# Patient Record
Sex: FEMALE | Race: BLACK OR AFRICAN AMERICAN | NOT HISPANIC OR LATINO
[De-identification: names, ages, dates, MRNs, and addresses within clinical notes are randomized per-mention and may not be internally consistent; named-entity substitution may affect disease eponyms.]

---

## 2018-08-14 ENCOUNTER — APPOINTMENT (OUTPATIENT)
Dept: PULMONOLOGY | Facility: CLINIC | Age: 48
End: 2018-08-14
Payer: COMMERCIAL

## 2018-08-14 ENCOUNTER — NON-APPOINTMENT (OUTPATIENT)
Age: 48
End: 2018-08-14

## 2018-08-14 VITALS
HEIGHT: 61 IN | WEIGHT: 179 LBS | BODY MASS INDEX: 33.79 KG/M2 | DIASTOLIC BLOOD PRESSURE: 79 MMHG | OXYGEN SATURATION: 100 % | SYSTOLIC BLOOD PRESSURE: 119 MMHG | HEART RATE: 84 BPM

## 2018-08-14 DIAGNOSIS — E88.81 METABOLIC SYNDROME: ICD-10-CM

## 2018-08-14 DIAGNOSIS — R00.2 PALPITATIONS: ICD-10-CM

## 2018-08-14 DIAGNOSIS — E66.9 OBESITY, UNSPECIFIED: ICD-10-CM

## 2018-08-14 DIAGNOSIS — R06.00 DYSPNEA, UNSPECIFIED: ICD-10-CM

## 2018-08-14 DIAGNOSIS — G47.33 OBSTRUCTIVE SLEEP APNEA (ADULT) (PEDIATRIC): ICD-10-CM

## 2018-08-14 DIAGNOSIS — E55.9 VITAMIN D DEFICIENCY, UNSPECIFIED: ICD-10-CM

## 2018-08-14 PROCEDURE — 94727 GAS DIL/WSHOT DETER LNG VOL: CPT

## 2018-08-14 PROCEDURE — 94729 DIFFUSING CAPACITY: CPT

## 2018-08-14 PROCEDURE — 94060 EVALUATION OF WHEEZING: CPT

## 2018-08-14 PROCEDURE — 93000 ELECTROCARDIOGRAM COMPLETE: CPT | Mod: 59

## 2018-08-14 PROCEDURE — 99204 OFFICE O/P NEW MOD 45 MIN: CPT | Mod: 25

## 2018-08-15 LAB
25(OH)D3 SERPL-MCNC: 20.3 NG/ML
HBA1C MFR BLD HPLC: 6 %
INSULIN SERPL-MCNC: 24.5 UU/ML

## 2018-08-28 ENCOUNTER — APPOINTMENT (OUTPATIENT)
Dept: PULMONOLOGY | Facility: CLINIC | Age: 48
End: 2018-08-28
Payer: COMMERCIAL

## 2018-08-28 PROCEDURE — 95806 SLEEP STUDY UNATT&RESP EFFT: CPT

## 2018-09-05 ENCOUNTER — RESULT REVIEW (OUTPATIENT)
Age: 48
End: 2018-09-05

## 2018-09-20 ENCOUNTER — APPOINTMENT (OUTPATIENT)
Dept: PULMONOLOGY | Facility: CLINIC | Age: 48
End: 2018-09-20

## 2019-06-19 ENCOUNTER — INPATIENT (INPATIENT)
Facility: HOSPITAL | Age: 49
LOS: 0 days | Discharge: HOME | DRG: 313 | End: 2019-06-20
Attending: STUDENT IN AN ORGANIZED HEALTH CARE EDUCATION/TRAINING PROGRAM | Admitting: STUDENT IN AN ORGANIZED HEALTH CARE EDUCATION/TRAINING PROGRAM
Payer: COMMERCIAL

## 2019-06-19 VITALS
OXYGEN SATURATION: 98 % | DIASTOLIC BLOOD PRESSURE: 88 MMHG | TEMPERATURE: 98 F | HEART RATE: 83 BPM | RESPIRATION RATE: 18 BRPM | SYSTOLIC BLOOD PRESSURE: 141 MMHG

## 2019-06-19 DIAGNOSIS — F17.200 NICOTINE DEPENDENCE, UNSPECIFIED, UNCOMPLICATED: ICD-10-CM

## 2019-06-19 DIAGNOSIS — Z29.9 ENCOUNTER FOR PROPHYLACTIC MEASURES, UNSPECIFIED: ICD-10-CM

## 2019-06-19 DIAGNOSIS — I24.9 ACUTE ISCHEMIC HEART DISEASE, UNSPECIFIED: ICD-10-CM

## 2019-06-19 DIAGNOSIS — R07.9 CHEST PAIN, UNSPECIFIED: ICD-10-CM

## 2019-06-19 LAB
ALBUMIN SERPL ELPH-MCNC: 3.7 G/DL — SIGNIFICANT CHANGE UP (ref 3.5–5)
ALP SERPL-CCNC: 75 U/L — SIGNIFICANT CHANGE UP (ref 40–120)
ALT FLD-CCNC: 24 U/L DA — SIGNIFICANT CHANGE UP (ref 10–60)
ANION GAP SERPL CALC-SCNC: 4 MMOL/L — LOW (ref 5–17)
APTT BLD: 29.5 SEC — SIGNIFICANT CHANGE UP (ref 27.5–36.3)
AST SERPL-CCNC: 13 U/L — SIGNIFICANT CHANGE UP (ref 10–40)
BILIRUB SERPL-MCNC: 0.2 MG/DL — SIGNIFICANT CHANGE UP (ref 0.2–1.2)
BUN SERPL-MCNC: 10 MG/DL — SIGNIFICANT CHANGE UP (ref 7–18)
CALCIUM SERPL-MCNC: 9.4 MG/DL — SIGNIFICANT CHANGE UP (ref 8.4–10.5)
CHLORIDE SERPL-SCNC: 108 MMOL/L — SIGNIFICANT CHANGE UP (ref 96–108)
CK MB BLD-MCNC: <1.2 % — SIGNIFICANT CHANGE UP (ref 0–3.5)
CK MB CFR SERPL CALC: <1 NG/ML — SIGNIFICANT CHANGE UP (ref 0–3.6)
CK SERPL-CCNC: 83 U/L — SIGNIFICANT CHANGE UP (ref 21–215)
CO2 SERPL-SCNC: 29 MMOL/L — SIGNIFICANT CHANGE UP (ref 22–31)
CREAT SERPL-MCNC: 0.72 MG/DL — SIGNIFICANT CHANGE UP (ref 0.5–1.3)
GLUCOSE SERPL-MCNC: 104 MG/DL — HIGH (ref 70–99)
HCG SERPL-ACNC: 1 MIU/ML — SIGNIFICANT CHANGE UP
HCT VFR BLD CALC: 36.7 % — SIGNIFICANT CHANGE UP (ref 34.5–45)
HGB BLD-MCNC: 11.8 G/DL — SIGNIFICANT CHANGE UP (ref 11.5–15.5)
INR BLD: 1.12 RATIO — SIGNIFICANT CHANGE UP (ref 0.88–1.16)
MCHC RBC-ENTMCNC: 27.8 PG — SIGNIFICANT CHANGE UP (ref 27–34)
MCHC RBC-ENTMCNC: 32.2 GM/DL — SIGNIFICANT CHANGE UP (ref 32–36)
MCV RBC AUTO: 86.6 FL — SIGNIFICANT CHANGE UP (ref 80–100)
NRBC # BLD: 0 /100 WBCS — SIGNIFICANT CHANGE UP (ref 0–0)
PLATELET # BLD AUTO: 171 K/UL — SIGNIFICANT CHANGE UP (ref 150–400)
POTASSIUM SERPL-MCNC: 4.5 MMOL/L — SIGNIFICANT CHANGE UP (ref 3.5–5.3)
POTASSIUM SERPL-SCNC: 4.5 MMOL/L — SIGNIFICANT CHANGE UP (ref 3.5–5.3)
PROT SERPL-MCNC: 7.7 G/DL — SIGNIFICANT CHANGE UP (ref 6–8.3)
PROTHROM AB SERPL-ACNC: 12.5 SEC — SIGNIFICANT CHANGE UP (ref 10–12.9)
RBC # BLD: 4.24 M/UL — SIGNIFICANT CHANGE UP (ref 3.8–5.2)
RBC # FLD: 13 % — SIGNIFICANT CHANGE UP (ref 10.3–14.5)
SODIUM SERPL-SCNC: 141 MMOL/L — SIGNIFICANT CHANGE UP (ref 135–145)
TROPONIN I SERPL-MCNC: <0.015 NG/ML — SIGNIFICANT CHANGE UP (ref 0–0.04)
TROPONIN I SERPL-MCNC: <0.015 NG/ML — SIGNIFICANT CHANGE UP (ref 0–0.04)
WBC # BLD: 4.28 K/UL — SIGNIFICANT CHANGE UP (ref 3.8–10.5)
WBC # FLD AUTO: 4.28 K/UL — SIGNIFICANT CHANGE UP (ref 3.8–10.5)

## 2019-06-19 PROCEDURE — 99223 1ST HOSP IP/OBS HIGH 75: CPT | Mod: GC

## 2019-06-19 PROCEDURE — 71045 X-RAY EXAM CHEST 1 VIEW: CPT | Mod: 26

## 2019-06-19 PROCEDURE — 99285 EMERGENCY DEPT VISIT HI MDM: CPT

## 2019-06-19 RX ORDER — METOPROLOL TARTRATE 50 MG
12.5 TABLET ORAL
Refills: 0 | Status: DISCONTINUED | OUTPATIENT
Start: 2019-06-19 | End: 2019-06-20

## 2019-06-19 RX ORDER — SIMVASTATIN 20 MG/1
40 TABLET, FILM COATED ORAL AT BEDTIME
Refills: 0 | Status: DISCONTINUED | OUTPATIENT
Start: 2019-06-19 | End: 2019-06-20

## 2019-06-19 RX ORDER — ASPIRIN/CALCIUM CARB/MAGNESIUM 324 MG
81 TABLET ORAL DAILY
Refills: 0 | Status: DISCONTINUED | OUTPATIENT
Start: 2019-06-19 | End: 2019-06-20

## 2019-06-19 RX ORDER — NITROGLYCERIN 6.5 MG
0.4 CAPSULE, EXTENDED RELEASE ORAL
Refills: 0 | Status: DISCONTINUED | OUTPATIENT
Start: 2019-06-19 | End: 2019-06-20

## 2019-06-19 RX ADMIN — SIMVASTATIN 40 MILLIGRAM(S): 20 TABLET, FILM COATED ORAL at 21:48

## 2019-06-19 RX ADMIN — Medication 0.4 MILLIGRAM(S): at 15:12

## 2019-06-19 NOTE — ED PROVIDER NOTE - PROGRESS NOTE DETAILS
Patient is resting comfortably, NAD. Feels improved after nitroglycerin. Patient endorsed to Dr. Finch

## 2019-06-19 NOTE — H&P ADULT - ATTENDING COMMENTS
Agree with above   patient seen and examined in ED. She is 50 yo lady with no significant PMH, but with family history of early CAD, presented with c.o chest pain. The chest pain is pressure like in nature with radiation to left arm and arm numbness, which improved with nitroglycerin.   Also, reports sob,  sweating and nausea. Denies headache, dizziness, fever, abdominal pain, change in urinary or bowel habits   She smokes half a pack a day since age of 19.     ROS as above   PE: General:  obese lady, sitting on the bed, NAD   Cardio: regular rate and rhythm   Pulm: clear breath sounds   GI: abdomen is soft, non tender   Extr: no edema   Neuro: AOx3, no focal weakness     Vital Signs Last 24 Hrs  T(C): 36.8 (19 Jun 2019 15:44), Max: 36.8 (19 Jun 2019 14:15)  T(F): 98.2 (19 Jun 2019 15:44), Max: 98.3 (19 Jun 2019 14:15)  HR: 78 (19 Jun 2019 15:44) (78 - 83)  BP: 111/62 (19 Jun 2019 15:44) (111/62 - 141/88)  BP(mean): --  RR: 18 (19 Jun 2019 15:44) (18 - 18)  SpO2: 100% (19 Jun 2019 15:44) (98% - 100%)    1. Chest pain in patient with strong family of CAD in father and sister. Concern for ACS   Admit to telemetry for 24 hrs   Trend CE x3, first set negative   EKG in ED NSR   Follow lipid profile, TSH, HBA1C  Start Aspirin, atorvastatin 40 mg po daily and Pbmbndtgbie60.5 mg PO BID   TTE in AM   Cardiology consult requested     2. Current smoker: smoking cessation counseling provided        Plan discussed with patient in detail.

## 2019-06-19 NOTE — ED ADULT NURSE NOTE - NSIMPLEMENTINTERV_GEN_ALL_ED
Implemented All Universal Safety Interventions:  [place] to call system. Call bell, personal items and telephone within reach. Instruct patient to call for assistance. Room bathroom lighting operational. Non-slip footwear when patient is off stretcher. Physically safe environment: no spills, clutter or unnecessary equipment. Stretcher in lowest position, wheels locked, appropriate side rails in place.

## 2019-06-19 NOTE — H&P ADULT - NSHPPHYSICALEXAM_GEN_ALL_CORE
Vital Signs Last 24 Hrs  T(C): 36.8 (19 Jun 2019 15:44), Max: 36.8 (19 Jun 2019 14:15)  T(F): 98.2 (19 Jun 2019 15:44), Max: 98.3 (19 Jun 2019 14:15)  HR: 78 (19 Jun 2019 15:44) (78 - 83)  BP: 111/62 (19 Jun 2019 15:44) (111/62 - 141/88)  RR: 18 (19 Jun 2019 15:44) (18 - 18)  SpO2: 100% (19 Jun 2019 15:44) (98% - 100%)  .  GENERAL: Well developed, Obese  female, NAD  HEENT:  Normocephalic/Atraumatic, reactive light reflex, moist mucous membranes  NECK: Supple, no JVD  RESP: Symmetric movement of the chest, clear to auscultation bilaterally, no wheeze, no rhonchi, no crackles noted  CVS: S1 and S2 audible, no murmur, rubs or gallops noted  GI: Normal active bowel sounds present, abdomen soft, non tender, non distended  EXTREMITIES:  No edema, no clubbing, cyanosis  MSK: 5/5 strength bilateral upper and lower extremities, intact sensations to light & crude touch  PSYCH: Normal mood, normal affect observed    NEURO: Alert and oriented x 3

## 2019-06-19 NOTE — ED ADULT TRIAGE NOTE - CHIEF COMPLAINT QUOTE
chest pain radiating to left and right shoulder with episode of numbness to left arm. patient also complained of shortness of breath with chest pain

## 2019-06-19 NOTE — H&P ADULT - PROBLEM SELECTOR PLAN 1
Atypical chest pain  Normal cardiac enzymes  EKG: normal sinus @ 78 bpm  CXR: Clear  Low wells score  Ordered Echocardiogram  Tele monitoring and started ASA, Statin  F/u Urine drug screen Atypical chest pain  Normal cardiac enzymes  EKG: normal sinus @ 78 bpm  CXR: Clear  Low wells score  Ordered Echocardiogram  Tele monitoring and started ASA, Statin  F/u Urine drug screen  Consulted Dr Dobson

## 2019-06-19 NOTE — H&P ADULT - ASSESSMENT
49 female with PMH of Fe def Anemia (s/p multiple transfusions in 2002), Cholecystectomy came to hospital for chest pain and tightness. Admitted to medicine for ACS.

## 2019-06-19 NOTE — H&P ADULT - HISTORY OF PRESENT ILLNESS
49 female with PMH of Fe def Anemia (s/p multiple transfusions in 2002), Cholecystectomy came to hospital for chest pain and tightness started acutely today while she was sitting. It started as mild right sided discomfort which resolved and came back as left sided pressure with tightness. She also felt numbness and tingling in left hand. She took aspirin with minimal relief of symptoms. All of this was associated with warmth and shortness of breath. Symptoms resolved after getting nitro in ED.    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 No drugs.  FH: Diabetes in Mother and cancer in multiple family members.    ED Course: Hemodynamically stable. Labs showed normal cardiac enzymes and EKG. CXR was negative. 49 female with PMH of Fe def Anemia (s/p multiple transfusions in 2002), Cholecystectomy came to hospital for chest pain and tightness started acutely today while she was sitting. It started as mild right sided discomfort which resolved and came back as left sided pressure with tightness. She also felt numbness and tingling in left hand. She took aspirin with minimal relief of symptoms. All of this was associated with warmth and shortness of breath. Symptoms resolved after getting nitro in ED. No fever, cough, pleuritic nature of pain, syncope, problems with urine or bowel. Pt only takes Motrin at home for tooth ache as needed.     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 No drugs.  FH: Diabetes in Mother and cancer in multiple family members.    ED Course: Hemodynamically stable. Labs showed normal cardiac enzymes and EKG. CXR was negative.

## 2019-06-19 NOTE — ED PROVIDER NOTE - CLINICAL SUMMARY MEDICAL DECISION MAKING FREE TEXT BOX
pt with exertional chest pain concerning for ACS with multiple cardiac risk factors requires admission for cardiac eval.

## 2019-06-19 NOTE — ED PROVIDER NOTE - OBJECTIVE STATEMENT
48 y/o f with PMHx borderline diabetes (no medications) presents with right sided chest pain but over the next 2 hours became bilateral with tightness and tingling in left arm. Pain at its worst was 9/10 currently 4/10.  when pain was severe it was coupled with  SOB, nausea and diaphoresis worse with excertion. Only remaining sx is chest pain. Smoker 30 years half pack a day. Fx: ACS, father MI in early 60s, sister Mi at 50 50 y/o f with PMHx borderline diabetes (no medications) presents with right sided chest pain but over the next 2 hours became bilateral with tightness and tingling in left arm. Pain at its worst was 9/10, currently 4/10.  when pain was severe it was associated with  SOB, nausea, and diaphoresis worse with exertion. Only remaining sx is chest pain. Smoker 30 years half pack a day. No fever, cough, ap, vomiting. Fx: ACS, father MI in early 60s, sister Mi at 50

## 2019-06-20 ENCOUNTER — TRANSCRIPTION ENCOUNTER (OUTPATIENT)
Age: 49
End: 2019-06-20

## 2019-06-20 VITALS
OXYGEN SATURATION: 100 % | RESPIRATION RATE: 18 BRPM | DIASTOLIC BLOOD PRESSURE: 59 MMHG | TEMPERATURE: 98 F | SYSTOLIC BLOOD PRESSURE: 106 MMHG | HEART RATE: 85 BPM

## 2019-06-20 LAB
ALBUMIN SERPL ELPH-MCNC: 3.5 G/DL — SIGNIFICANT CHANGE UP (ref 3.5–5)
ALP SERPL-CCNC: 67 U/L — SIGNIFICANT CHANGE UP (ref 40–120)
ALT FLD-CCNC: 22 U/L DA — SIGNIFICANT CHANGE UP (ref 10–60)
ANION GAP SERPL CALC-SCNC: 6 MMOL/L — SIGNIFICANT CHANGE UP (ref 5–17)
AST SERPL-CCNC: 11 U/L — SIGNIFICANT CHANGE UP (ref 10–40)
BASOPHILS # BLD AUTO: 0.02 K/UL — SIGNIFICANT CHANGE UP (ref 0–0.2)
BASOPHILS NFR BLD AUTO: 0.5 % — SIGNIFICANT CHANGE UP (ref 0–2)
BILIRUB SERPL-MCNC: 0.3 MG/DL — SIGNIFICANT CHANGE UP (ref 0.2–1.2)
BUN SERPL-MCNC: 10 MG/DL — SIGNIFICANT CHANGE UP (ref 7–18)
CALCIUM SERPL-MCNC: 8.3 MG/DL — LOW (ref 8.4–10.5)
CHLORIDE SERPL-SCNC: 107 MMOL/L — SIGNIFICANT CHANGE UP (ref 96–108)
CHOLEST SERPL-MCNC: 132 MG/DL — SIGNIFICANT CHANGE UP (ref 10–199)
CK MB BLD-MCNC: <1.4 % — SIGNIFICANT CHANGE UP (ref 0–3.5)
CK MB CFR SERPL CALC: <1 NG/ML — SIGNIFICANT CHANGE UP (ref 0–3.6)
CK SERPL-CCNC: 69 U/L — SIGNIFICANT CHANGE UP (ref 21–215)
CO2 SERPL-SCNC: 25 MMOL/L — SIGNIFICANT CHANGE UP (ref 22–31)
CREAT SERPL-MCNC: 0.69 MG/DL — SIGNIFICANT CHANGE UP (ref 0.5–1.3)
EOSINOPHIL # BLD AUTO: 0.15 K/UL — SIGNIFICANT CHANGE UP (ref 0–0.5)
EOSINOPHIL NFR BLD AUTO: 4 % — SIGNIFICANT CHANGE UP (ref 0–6)
FOLATE SERPL-MCNC: 7 NG/ML — SIGNIFICANT CHANGE UP
GLUCOSE SERPL-MCNC: 99 MG/DL — SIGNIFICANT CHANGE UP (ref 70–99)
HBA1C BLD-MCNC: 5.9 % — HIGH (ref 4–5.6)
HCT VFR BLD CALC: 34.9 % — SIGNIFICANT CHANGE UP (ref 34.5–45)
HDLC SERPL-MCNC: 50 MG/DL — SIGNIFICANT CHANGE UP
HGB BLD-MCNC: 11.3 G/DL — LOW (ref 11.5–15.5)
IMM GRANULOCYTES NFR BLD AUTO: 0.3 % — SIGNIFICANT CHANGE UP (ref 0–1.5)
LIPID PNL WITH DIRECT LDL SERPL: 71 MG/DL — SIGNIFICANT CHANGE UP
LYMPHOCYTES # BLD AUTO: 1.71 K/UL — SIGNIFICANT CHANGE UP (ref 1–3.3)
LYMPHOCYTES # BLD AUTO: 46.1 % — HIGH (ref 13–44)
MAGNESIUM SERPL-MCNC: 1.9 MG/DL — SIGNIFICANT CHANGE UP (ref 1.6–2.6)
MCHC RBC-ENTMCNC: 27.8 PG — SIGNIFICANT CHANGE UP (ref 27–34)
MCHC RBC-ENTMCNC: 32.4 GM/DL — SIGNIFICANT CHANGE UP (ref 32–36)
MCV RBC AUTO: 85.7 FL — SIGNIFICANT CHANGE UP (ref 80–100)
MONOCYTES # BLD AUTO: 0.49 K/UL — SIGNIFICANT CHANGE UP (ref 0–0.9)
MONOCYTES NFR BLD AUTO: 13.2 % — SIGNIFICANT CHANGE UP (ref 2–14)
NEUTROPHILS # BLD AUTO: 1.33 K/UL — LOW (ref 1.8–7.4)
NEUTROPHILS NFR BLD AUTO: 35.9 % — LOW (ref 43–77)
NRBC # BLD: 0 /100 WBCS — SIGNIFICANT CHANGE UP (ref 0–0)
PCP SPEC-MCNC: SIGNIFICANT CHANGE UP
PHOSPHATE SERPL-MCNC: 3.8 MG/DL — SIGNIFICANT CHANGE UP (ref 2.5–4.5)
PLATELET # BLD AUTO: 175 K/UL — SIGNIFICANT CHANGE UP (ref 150–400)
POTASSIUM SERPL-MCNC: 4 MMOL/L — SIGNIFICANT CHANGE UP (ref 3.5–5.3)
POTASSIUM SERPL-SCNC: 4 MMOL/L — SIGNIFICANT CHANGE UP (ref 3.5–5.3)
PROT SERPL-MCNC: 6.9 G/DL — SIGNIFICANT CHANGE UP (ref 6–8.3)
RBC # BLD: 4.07 M/UL — SIGNIFICANT CHANGE UP (ref 3.8–5.2)
RBC # FLD: 13 % — SIGNIFICANT CHANGE UP (ref 10.3–14.5)
SODIUM SERPL-SCNC: 138 MMOL/L — SIGNIFICANT CHANGE UP (ref 135–145)
TOTAL CHOLESTEROL/HDL RATIO MEASUREMENT: 2.6 RATIO — LOW (ref 3.3–7.1)
TRIGL SERPL-MCNC: 55 MG/DL — SIGNIFICANT CHANGE UP (ref 10–149)
TROPONIN I SERPL-MCNC: <0.015 NG/ML — SIGNIFICANT CHANGE UP (ref 0–0.04)
TSH SERPL-MCNC: 0.97 UU/ML — SIGNIFICANT CHANGE UP (ref 0.34–4.82)
VIT B12 SERPL-MCNC: 504 PG/ML — SIGNIFICANT CHANGE UP (ref 232–1245)
WBC # BLD: 3.71 K/UL — LOW (ref 3.8–10.5)
WBC # FLD AUTO: 3.71 K/UL — LOW (ref 3.8–10.5)

## 2019-06-20 PROCEDURE — 82553 CREATINE MB FRACTION: CPT

## 2019-06-20 PROCEDURE — 78452 HT MUSCLE IMAGE SPECT MULT: CPT | Mod: 26

## 2019-06-20 PROCEDURE — 93306 TTE W/DOPPLER COMPLETE: CPT | Mod: 26

## 2019-06-20 PROCEDURE — 83036 HEMOGLOBIN GLYCOSYLATED A1C: CPT

## 2019-06-20 PROCEDURE — 85610 PROTHROMBIN TIME: CPT

## 2019-06-20 PROCEDURE — 80061 LIPID PANEL: CPT

## 2019-06-20 PROCEDURE — 99222 1ST HOSP IP/OBS MODERATE 55: CPT | Mod: 25

## 2019-06-20 PROCEDURE — 84443 ASSAY THYROID STIM HORMONE: CPT

## 2019-06-20 PROCEDURE — 80307 DRUG TEST PRSMV CHEM ANLYZR: CPT

## 2019-06-20 PROCEDURE — 85027 COMPLETE CBC AUTOMATED: CPT

## 2019-06-20 PROCEDURE — 93306 TTE W/DOPPLER COMPLETE: CPT

## 2019-06-20 PROCEDURE — A9502: CPT

## 2019-06-20 PROCEDURE — 84100 ASSAY OF PHOSPHORUS: CPT

## 2019-06-20 PROCEDURE — 78452 HT MUSCLE IMAGE SPECT MULT: CPT

## 2019-06-20 PROCEDURE — 84484 ASSAY OF TROPONIN QUANT: CPT

## 2019-06-20 PROCEDURE — 71045 X-RAY EXAM CHEST 1 VIEW: CPT

## 2019-06-20 PROCEDURE — 82550 ASSAY OF CK (CPK): CPT

## 2019-06-20 PROCEDURE — 80053 COMPREHEN METABOLIC PANEL: CPT

## 2019-06-20 PROCEDURE — 99238 HOSP IP/OBS DSCHRG MGMT 30/<: CPT | Mod: GC

## 2019-06-20 PROCEDURE — 93005 ELECTROCARDIOGRAM TRACING: CPT

## 2019-06-20 PROCEDURE — 82746 ASSAY OF FOLIC ACID SERUM: CPT

## 2019-06-20 PROCEDURE — 36415 COLL VENOUS BLD VENIPUNCTURE: CPT

## 2019-06-20 PROCEDURE — 93017 CV STRESS TEST TRACING ONLY: CPT

## 2019-06-20 PROCEDURE — 83735 ASSAY OF MAGNESIUM: CPT

## 2019-06-20 PROCEDURE — 93016 CV STRESS TEST SUPVJ ONLY: CPT

## 2019-06-20 PROCEDURE — 84702 CHORIONIC GONADOTROPIN TEST: CPT

## 2019-06-20 PROCEDURE — 99285 EMERGENCY DEPT VISIT HI MDM: CPT | Mod: 25

## 2019-06-20 PROCEDURE — 82607 VITAMIN B-12: CPT

## 2019-06-20 PROCEDURE — 93018 CV STRESS TEST I&R ONLY: CPT

## 2019-06-20 PROCEDURE — 85730 THROMBOPLASTIN TIME PARTIAL: CPT

## 2019-06-20 RX ORDER — IBUPROFEN 200 MG
1 TABLET ORAL
Qty: 0 | Refills: 0 | DISCHARGE

## 2019-06-20 RX ADMIN — Medication 81 MILLIGRAM(S): at 12:17

## 2019-06-20 NOTE — DISCHARGE NOTE PROVIDER - NSDCCPCAREPLAN_GEN_ALL_CORE_FT
PRINCIPAL DISCHARGE DIAGNOSIS  Diagnosis: Chest pain, unspecified type  Assessment and Plan of Treatment: You presented with chest tightness and were admitted to evaluate for acute coronary syndrome, Your ekg did not show abnormalities, heart enzymes were normal. You were seen by our cardiologist who recommended a stress test which was normal. Y      SECONDARY DISCHARGE DIAGNOSES  Diagnosis: Prediabetes  Assessment and Plan of Treatment: Your hemoglobin A1C level was found to be 5.9, suggestive of prediabetes. You are recommended a carbohydrate consistent diet and exercise on a regular basis. Follow up with your primary doctor to monitor your blood sugars. PRINCIPAL DISCHARGE DIAGNOSIS  Diagnosis: Chest pain, unspecified type  Assessment and Plan of Treatment: You presented with chest tightness and were admitted to evaluate for acute coronary syndrome, Your ekg did not show abnormalities, heart enzymes were normal. You were seen by our cardiologist who recommended a stress test which was normal. Your chest pain was atypical. You are recommended to follow up with your primary doctor in 1 week      SECONDARY DISCHARGE DIAGNOSES  Diagnosis: Prediabetes  Assessment and Plan of Treatment: Your hemoglobin A1C level was found to be 5.9, suggestive of prediabetes. You are recommended a carbohydrate consistent diet and exercise on a regular basis. Follow up with your primary doctor to monitor your blood sugars. PRINCIPAL DISCHARGE DIAGNOSIS  Diagnosis: Chest pain, unspecified type  Assessment and Plan of Treatment: You presented with chest tightness and were admitted to evaluate for acute coronary syndrome, Your ekg did not show abnormalities, heart enzymes were normal. You were seen by our cardiologist who recommended echocardiogram and stress test which were normal. Your chest pain was atypical. You are recommended to follow up with your primary doctor in 1 week      SECONDARY DISCHARGE DIAGNOSES  Diagnosis: Prediabetes  Assessment and Plan of Treatment: Your hemoglobin A1C level was found to be 5.9, suggestive of prediabetes. You are recommended a carbohydrate consistent diet and exercise on a regular basis. Follow up with your primary doctor to monitor your blood sugars.

## 2019-06-20 NOTE — DISCHARGE NOTE PROVIDER - CARE PROVIDER_API CALL
Catalino Bobby)  Internal Medicine  [id number] [address] [place] [address]  Phone: [phone]  Fax: [phone]  Follow Up Time: 1 week

## 2019-06-20 NOTE — CHART NOTE - NSCHARTNOTEFT_GEN_A_CORE
Patient seen and examined. No new complains.   CEx3 negative, stress test negative, awaiting TTE.   Vital Signs Last 24 Hrs  T(C): 36.8 (20 Jun 2019 11:23), Max: 36.9 (19 Jun 2019 20:26)  T(F): 98.2 (20 Jun 2019 11:23), Max: 98.5 (19 Jun 2019 20:26)  HR: 74 (20 Jun 2019 11:23) (70 - 85)  BP: 119/76 (20 Jun 2019 11:23) (95/56 - 141/88)  BP(mean): --  RR: 18 (20 Jun 2019 11:23) (18 - 18)  SpO2: 99% (20 Jun 2019 11:23) (93% - 100%)    1. Chest pain - unlikely its cardiac origin. CEx3 negative, stress test negative   Can be discharge home after echo.   d/c Metoprolol.

## 2019-06-20 NOTE — DISCHARGE NOTE NURSING/CASE MANAGEMENT/SOCIAL WORK - NSDCDPATPORTLINK_GEN_ALL_CORE
You can access the [facility] Patient Portal, offered by [facility], by registering with the following website: [email or web address]

## 2019-06-20 NOTE — CONSULT NOTE ADULT - ASSESSMENT
50 yo F smoker with iron deficiency anemia who presented with chest pain.     1. Chest pain:  -Echocardiogram pending  -Will discuss with patient regarding stress test    2. HLD: Lipid panel adequate    ***Note that this is a preliminary note and any recommendations should NOT be carried out until this note is finalized. *** 48 yo F smoker with iron deficiency anemia who presented with chest pain.     1. Chest pain:  -Sounds non-cardiac in nature, however since patient is female and an active smoker, reasonable to perform ischemic evaluation with exercise nuclear stress test. Will also rule out structural heart disease with echocardiogram  ****Stress test and echocardiogram essentially unremarkable, no need for further cardiac testing at this time  -Patient counselled on smoking cessation  -Instructed if symptoms recur or progress she will need further follow up with cardiology    2. HLD: Lipid panel adequate

## 2019-06-20 NOTE — DISCHARGE NOTE PROVIDER - HOSPITAL COURSE
49 female with PMH of Fe def Anemia (s/p multiple transfusions in 2002), Cholecystectomy came to hospital for chest pain and tightness started acutely today while she was sitting. It started as mild right sided discomfort which resolved and came back as left sided pressure with tightness. She also felt numbness and tingling in left hand. She took aspirin with minimal relief of symptoms. All of this was associated with warmth and shortness of breath. Symptoms resolved after getting nitro in ED. No fever, cough, pleuritic nature of pain, syncope, problems with urine or bowel. Pt only takes Motrin at home for tooth ache as needed.     Patient was admitted to tele floor for acs rule out. Cardio Dr Dobson was consulted, ekg nsr, cardiac enzymes and stress test were normal. Echo noted with xxxxxxxxxxxxxxxxxx    Patient's a1c 5.9, prediabetic.        Patient's symptoms have subsided since, is clinically stable for discharge as discussed with attending Dr Finch. 49 female with PMH of Fe def Anemia (s/p multiple transfusions in 2002), Cholecystectomy came to hospital for chest pain and tightness started acutely today while she was sitting. It started as mild right sided discomfort which resolved and came back as left sided pressure with tightness. She also felt numbness and tingling in left hand. She took aspirin with minimal relief of symptoms. All of this was associated with warmth and shortness of breath. Symptoms resolved after getting nitro in ED. No fever, cough, pleuritic nature of pain, syncope, problems with urine or bowel. Pt only takes Motrin at home for tooth ache as needed.     Patient was admitted to tele floor for acs rule out. Cardio Dr Dobson was consulted, ekg nsr, cardiac enzymes , echo and stress test were normal.     Patient's a1c 5.9, prediabetic.        Patient's symptoms have subsided since, is clinically stable for discharge as discussed with attending Dr Finch.

## 2019-06-20 NOTE — CONSULT NOTE ADULT - SUBJECTIVE AND OBJECTIVE BOX
CHIEF COMPLAINT: Chest Pain    HPI: 50 yo F with Fe-deficiency anemia who presented with chest pain     PAST MEDICAL & SURGICAL HISTORY:  As above, also borderline diabetes mellitus, Anemia, Renal Stone, and Asthma  No significant past surgical history    Allergies    IV dye (Vomiting)  No Known Drug Allergies  shellfish (Urticaria)    MEDICATIONS  (STANDING):  aspirin enteric coated 81 milliGRAM(s) Oral daily  metoprolol tartrate 12.5 milliGRAM(s) Oral two times a day  simvastatin 40 milliGRAM(s) Oral at bedtime    MEDICATIONS  (PRN):  nitroglycerin     SubLingual 0.4 milliGRAM(s) SubLingual every 5 minutes PRN Chest Pain      FAMILY HISTORY:    No family history of premature coronary artery disease or sudden cardiac death    SOCIAL HISTORY:  Smoking-  Alcohol-  Illicit Drug use-    REVIEW OF SYSTEMS:  Constitutional: [ ] fever, [ ]weight loss,  [ ]fatigue  Eyes: [ ] visual changes  Respiratory: [ ]shortness of breath;  [ ] cough, [ ]wheezing, [ ]chills, [ ]hemoptysis  Cardiovascular: [ ] chest pain, [ ]palpitations, [ ]dizziness,  [ ]leg swelling [ ]syncope  Gastrointestinal: [ ] abdominal pain, [ ]nausea, [ ]vomiting,  [ ]diarrhea   Genitourinary: [ ] dysuria, [ ] hematuria  Neurologic: [ ] headaches [ ] tremors  [ ] weakness [ ] lightheadedness  Skin: [ ] itching, [ ]burning, [ ] rashes  Endocrine: [ ] heat or cold intolerance  Musculoskeletal: [ ] joint pain or swelling; [ ] muscle, back, or extremity pain  Psychiatric: [ ] depression, [ ]anxiety, [ ]mood swings, or [ ]difficulty sleeping  Hematologic: [ ] easy bruising, [ ] bleeding gums       [ x] All others negative	  [ ] Unable to obtain    Vital Signs Last 24 Hrs  T(C): 36.9 (20 Jun 2019 07:03), Max: 36.9 (19 Jun 2019 20:26)  T(F): 98.4 (20 Jun 2019 07:03), Max: 98.5 (19 Jun 2019 20:26)  HR: 70 (20 Jun 2019 07:03) (70 - 85)  BP: 95/56 (20 Jun 2019 07:03) (95/56 - 141/88)  BP(mean): --  RR: 18 (20 Jun 2019 07:03) (18 - 18)  SpO2: 96% (20 Jun 2019 07:03) (93% - 100%)  I&O's Summary      PHYSICAL EXAM:  General: No acute distress  HEENT: EOMI, PERRL  Neck: Supple, No JVD  Lungs: Clear to auscultation bilaterally; No rales or wheezing  Heart: Regular rate and rhythm; No murmurs, rubs, or gallops  Abdomen: Nontender, bowel sounds present  Extremities: No clubbing, cyanosis, or edema  Nervous system:  Alert & Oriented X3, no focal deficits  Psychiatric: Normal affect  Skin: No rashes or lesions      LABS:  06-20    138  |  107  |  10  ----------------------------<  99  4.0   |  25  |  0.69    Ca    8.3<L>      20 Jun 2019 07:14  Phos  3.8     06-20  Mg     1.9     06-20    TPro  6.9  /  Alb  3.5  /  TBili  0.3  /  DBili  x   /  AST  11  /  ALT  22  /  AlkPhos  67  06-20    Creatinine Trend: 0.69<--, 0.72<--                        11.3   3.71  )-----------( 175      ( 20 Jun 2019 07:14 )             34.9     PT/INR - ( 19 Jun 2019 14:54 )   PT: 12.5 sec;   INR: 1.12 ratio         PTT - ( 19 Jun 2019 14:54 )  PTT:29.5 sec    Lipid Panel: Cholesterol, Serum 132  Direct LDL 71  HDL Cholesterol, Serum 50  Triglycerides, Serum 55    Cardiac Enzymes: CARDIAC MARKERS ( 20 Jun 2019 07:14 )  <0.015 ng/mL / x     / 69 U/L / x     / <1.0 ng/mL  CARDIAC MARKERS ( 19 Jun 2019 22:23 )  <0.015 ng/mL / x     / 83 U/L / x     / <1.0 ng/mL  CARDIAC MARKERS ( 19 Jun 2019 14:54 )  <0.015 ng/mL / x     / x     / x     / x        RADIOLOGY: < from: Xray Chest 1 View AP/PA (06.19.19 @ 14:32) >  IMPRESSION:     Clear lungs.     < end of copied text >      ECG [my interpretation]: 6/19/2019 @ 13:54: Sinus rhythm, normal axis, normal EKG    TELEMETRY:    ECHO:    STRESS TEST:    CATHETERIZATION: CHIEF COMPLAINT: Chest Pain    HPI: 48 yo F smoker with Fe-deficiency anemia who presented with chest pain. Symptoms started yesterday when patient was sitting at her desk at work. She developed right-sided chest pain, which then became left sided chest pain and tightness, with no radiation but noted left arm numbness/tingling. Patient became concerned and drove to the hospital; on the way she became diaphoretic. She is unsure if there was any associated dyspnea, denies associated lightheadedness or syncope. No similar episodes in the past. Currently has no symptoms. Patient states she had a cardiac catheterization for chest pain ~10 years ago, which was negative.      PAST MEDICAL & SURGICAL HISTORY:  As above, also borderline diabetes mellitus, Anemia, Renal Stone, and Asthma  No significant past surgical history    Allergies    IV dye (Vomiting)  No Known Drug Allergies  shellfish (Urticaria)    MEDICATIONS  (STANDING):  aspirin enteric coated 81 milliGRAM(s) Oral daily  metoprolol tartrate 12.5 milliGRAM(s) Oral two times a day  simvastatin 40 milliGRAM(s) Oral at bedtime    MEDICATIONS  (PRN):  nitroglycerin     SubLingual 0.4 milliGRAM(s) SubLingual every 5 minutes PRN Chest Pain      FAMILY HISTORY:    Father: MI at age 60    SOCIAL HISTORY:  Smoking-30 years x 1/2 PPD  Alcohol-social  Illicit Drug use-denies    REVIEW OF SYSTEMS:  Constitutional: [ ] fever, [ ]weight loss,  [ ]fatigue  Eyes: [ ] visual changes  Respiratory: [ ]shortness of breath;  [ ] cough, [ ]wheezing, [ ]chills, [ ]hemoptysis  Cardiovascular: [ x] chest pain, [ ]palpitations, [ ]dizziness,  [ ]leg swelling [ ]syncope  Gastrointestinal: [ ] abdominal pain, [ ]nausea, [ ]vomiting,  [ ]diarrhea   Genitourinary: [ ] dysuria, [ ] hematuria  Neurologic: [ ] headaches [ ] tremors  [ ] weakness [ ] lightheadedness  Skin: [ ] itching, [ ]burning, [ ] rashes  Endocrine: [ ] heat or cold intolerance  Musculoskeletal: [ ] joint pain or swelling; [ ] muscle, back, or extremity pain  Psychiatric: [ ] depression, [ ]anxiety, [ ]mood swings, or [ ]difficulty sleeping  Hematologic: [ ] easy bruising, [ ] bleeding gums       [ x] All others negative	  [ ] Unable to obtain    Vital Signs Last 24 Hrs  T(C): 36.9 (20 Jun 2019 07:03), Max: 36.9 (19 Jun 2019 20:26)  T(F): 98.4 (20 Jun 2019 07:03), Max: 98.5 (19 Jun 2019 20:26)  HR: 70 (20 Jun 2019 07:03) (70 - 85)  BP: 95/56 (20 Jun 2019 07:03) (95/56 - 141/88)  BP(mean): --  RR: 18 (20 Jun 2019 07:03) (18 - 18)  SpO2: 96% (20 Jun 2019 07:03) (93% - 100%)  I&O's Summary      PHYSICAL EXAM:  General: No acute distress  HEENT: EOMI, PERRL  Neck: Supple, No JVD  Lungs: Clear to auscultation bilaterally; No rales or wheezing  Heart: Regular rate and rhythm; No murmurs, rubs, or gallops  Abdomen: Nontender, bowel sounds present  Extremities: No clubbing, cyanosis, or edema  Nervous system:  Alert & Oriented X3, no focal deficits  Psychiatric: Normal affect  Skin: No rashes or lesions      LABS:  06-20    138  |  107  |  10  ----------------------------<  99  4.0   |  25  |  0.69    Ca    8.3<L>      20 Jun 2019 07:14  Phos  3.8     06-20  Mg     1.9     06-20    TPro  6.9  /  Alb  3.5  /  TBili  0.3  /  DBili  x   /  AST  11  /  ALT  22  /  AlkPhos  67  06-20    Creatinine Trend: 0.69<--, 0.72<--                        11.3   3.71  )-----------( 175      ( 20 Jun 2019 07:14 )             34.9     PT/INR - ( 19 Jun 2019 14:54 )   PT: 12.5 sec;   INR: 1.12 ratio         PTT - ( 19 Jun 2019 14:54 )  PTT:29.5 sec    Lipid Panel: Cholesterol, Serum 132  Direct LDL 71  HDL Cholesterol, Serum 50  Triglycerides, Serum 55    Cardiac Enzymes: CARDIAC MARKERS ( 20 Jun 2019 07:14 )  <0.015 ng/mL / x     / 69 U/L / x     / <1.0 ng/mL  CARDIAC MARKERS ( 19 Jun 2019 22:23 )  <0.015 ng/mL / x     / 83 U/L / x     / <1.0 ng/mL  CARDIAC MARKERS ( 19 Jun 2019 14:54 )  <0.015 ng/mL / x     / x     / x     / x        RADIOLOGY: < from: Xray Chest 1 View AP/PA (06.19.19 @ 14:32) >  IMPRESSION:     Clear lungs.     < end of copied text >      ECG [my interpretation]: 6/19/2019 @ 13:54: Sinus rhythm, normal axis, normal EKG    TELEMETRY: Sinus rhythm, no events    ECHO: < from: Transthoracic Echocardiogram (06.20.19 @ 07:24) >  CONCLUSIONS:  1. Normal mitral valve. Trace mitral regurgitation.  2. Normal trileaflet aortic valve. No aortic stenosis. No  aortic valve regurgitation seen.  3. Normal aortic root.  4. Normal left atrium.  5. Normal left ventricular internal dimensions and wall  thicknesses.  6. Normal Left Ventricular Systolic Function,  (EF = 67% by  biplane)  7. Normal diastolic function.  8. Normal right atrium.  9. Right ventricle not well visualized. Normal RV systolic  function (TAPSE 1.8 cm).  10. RV systolic pressure is normal at  31 mm Hg.  11. Normal tricuspid valve. Moderate to severe tricuspid  regurgitation.  12. Pulmonic valve not well seen. Trace pulmonic  insufficiency is noted.  13. No pericardial effusion.    < end of copied text >      STRESS TEST: < from: Nuclear Stress Test-Exercise (06.20.19 @ 09:53) >  IMPRESSIONS:  * Exercise capacity: 11 METS, Excellent for age and  gender. 86% of MPHR.  * There is a small, predominantly fixed, mild intensity  defect in the mid anterior wall that thickens, most  consistent with attenuation artifact.  * Post-stress resting myocardial perfusion gated SPECT  imaging was performed (LVEF > 70%;LVEDV = 71 ml.)  * Negative study for reversible ischemia    < end of copied text >

## 2019-07-01 PROBLEM — R73.03 PREDIABETES: Chronic | Status: ACTIVE | Noted: 2019-06-19

## 2019-07-11 ENCOUNTER — APPOINTMENT (OUTPATIENT)
Dept: ORTHOPEDIC SURGERY | Facility: CLINIC | Age: 49
End: 2019-07-11
Payer: COMMERCIAL

## 2019-07-11 DIAGNOSIS — M62.89 OTHER SPECIFIED DISORDERS OF MUSCLE: ICD-10-CM

## 2019-07-11 DIAGNOSIS — M21.42 FLAT FOOT [PES PLANUS] (ACQUIRED), LEFT FOOT: ICD-10-CM

## 2019-07-11 DIAGNOSIS — M21.41 FLAT FOOT [PES PLANUS] (ACQUIRED), RIGHT FOOT: ICD-10-CM

## 2019-07-11 DIAGNOSIS — M76.822 POSTERIOR TIBIAL TENDINITIS, LEFT LEG: ICD-10-CM

## 2019-07-11 DIAGNOSIS — M21.42 FLAT FOOT [PES PLANUS] (ACQUIRED), RIGHT FOOT: ICD-10-CM

## 2019-07-11 PROCEDURE — 99203 OFFICE O/P NEW LOW 30 MIN: CPT

## 2019-07-11 PROCEDURE — 73630 X-RAY EXAM OF FOOT: CPT | Mod: LT

## 2019-10-17 ENCOUNTER — APPOINTMENT (OUTPATIENT)
Dept: ORTHOPEDIC SURGERY | Facility: CLINIC | Age: 49
End: 2019-10-17

## 2021-02-09 ENCOUNTER — TRANSCRIPTION ENCOUNTER (OUTPATIENT)
Age: 51
End: 2021-02-09

## 2021-02-28 ENCOUNTER — TRANSCRIPTION ENCOUNTER (OUTPATIENT)
Age: 51
End: 2021-02-28

## 2021-03-26 ENCOUNTER — TRANSCRIPTION ENCOUNTER (OUTPATIENT)
Age: 51
End: 2021-03-26

## 2021-10-12 ENCOUNTER — LABORATORY RESULT (OUTPATIENT)
Age: 51
End: 2021-10-12

## 2021-10-12 ENCOUNTER — APPOINTMENT (OUTPATIENT)
Dept: DISASTER EMERGENCY | Facility: CLINIC | Age: 51
End: 2021-10-12

## 2022-01-10 ENCOUNTER — TRANSCRIPTION ENCOUNTER (OUTPATIENT)
Age: 52
End: 2022-01-10

## 2022-01-11 ENCOUNTER — TRANSCRIPTION ENCOUNTER (OUTPATIENT)
Age: 52
End: 2022-01-11

## 2022-11-08 ENCOUNTER — EMERGENCY (EMERGENCY)
Facility: HOSPITAL | Age: 52
LOS: 1 days | Discharge: HOME | End: 2022-11-08
Payer: COMMERCIAL

## 2022-11-08 VITALS
SYSTOLIC BLOOD PRESSURE: 136 MMHG | OXYGEN SATURATION: 99 % | DIASTOLIC BLOOD PRESSURE: 87 MMHG | RESPIRATION RATE: 17 BRPM

## 2022-11-08 VITALS
OXYGEN SATURATION: 98 % | WEIGHT: 166.01 LBS | HEIGHT: 62 IN | TEMPERATURE: 98 F | DIASTOLIC BLOOD PRESSURE: 90 MMHG | HEART RATE: 98 BPM | SYSTOLIC BLOOD PRESSURE: 139 MMHG | RESPIRATION RATE: 18 BRPM

## 2022-11-08 LAB
ALBUMIN SERPL ELPH-MCNC: 4 G/DL — SIGNIFICANT CHANGE UP (ref 3.5–5)
ALP SERPL-CCNC: 65 U/L — SIGNIFICANT CHANGE UP (ref 40–120)
ANION GAP SERPL CALC-SCNC: 5 MMOL/L — SIGNIFICANT CHANGE UP (ref 5–17)
APPEARANCE UR: CLEAR — SIGNIFICANT CHANGE UP
APTT BLD: 34.1 SEC — SIGNIFICANT CHANGE UP (ref 27.5–35.5)
BACTERIA # UR AUTO: ABNORMAL /HPF
BASOPHILS NFR BLD AUTO: 0.5 % — SIGNIFICANT CHANGE UP (ref 0–2)
BILIRUB UR-MCNC: NEGATIVE — SIGNIFICANT CHANGE UP
BLD GP AB SCN SERPL QL: SIGNIFICANT CHANGE UP
BUN SERPL-MCNC: 8 MG/DL — SIGNIFICANT CHANGE UP (ref 7–18)
CALCIUM SERPL-MCNC: 9.8 MG/DL — SIGNIFICANT CHANGE UP (ref 8.4–10.5)
CHLORIDE SERPL-SCNC: 107 MMOL/L — SIGNIFICANT CHANGE UP (ref 96–108)
CO2 SERPL-SCNC: 29 MMOL/L — SIGNIFICANT CHANGE UP (ref 22–31)
COLOR SPEC: YELLOW — SIGNIFICANT CHANGE UP
EGFR: 92 ML/MIN/1.73M2 — SIGNIFICANT CHANGE UP
EOSINOPHIL # BLD AUTO: 0.13 K/UL — SIGNIFICANT CHANGE UP (ref 0–0.5)
GLUCOSE UR QL: NEGATIVE — SIGNIFICANT CHANGE UP
HCG UR QL: NEGATIVE — SIGNIFICANT CHANGE UP
INR BLD: 1.16 RATIO — SIGNIFICANT CHANGE UP (ref 0.88–1.16)
KETONES UR-MCNC: NEGATIVE — SIGNIFICANT CHANGE UP
LEUKOCYTE ESTERASE UR-ACNC: NEGATIVE — SIGNIFICANT CHANGE UP
LYMPHOCYTES # BLD AUTO: 1.97 K/UL — SIGNIFICANT CHANGE UP (ref 1–3.3)
LYMPHOCYTES # BLD AUTO: 50.6 % — HIGH (ref 13–44)
MCHC RBC-ENTMCNC: 27.7 PG — SIGNIFICANT CHANGE UP (ref 27–34)
MONOCYTES # BLD AUTO: 0.27 K/UL — SIGNIFICANT CHANGE UP (ref 0–0.9)
NEUTROPHILS NFR BLD AUTO: 38.7 % — LOW (ref 43–77)
PLATELET # BLD AUTO: 190 K/UL — SIGNIFICANT CHANGE UP (ref 150–400)
PROT SERPL-MCNC: 8.1 G/DL — SIGNIFICANT CHANGE UP (ref 6–8.3)
PROT UR-MCNC: 15
PROTHROM AB SERPL-ACNC: 13.8 SEC — HIGH (ref 10.5–13.4)
RBC # FLD: 12.3 % — SIGNIFICANT CHANGE UP (ref 10.3–14.5)
RBC CASTS # UR COMP ASSIST: SIGNIFICANT CHANGE UP /HPF (ref 0–2)
SARS-COV-2 RNA SPEC QL NAA+PROBE: SIGNIFICANT CHANGE UP
SODIUM SERPL-SCNC: 141 MMOL/L — SIGNIFICANT CHANGE UP (ref 135–145)
UFH PPP CHRO-ACNC: 0 IU/ML — LOW (ref 0.3–0.7)
UROBILINOGEN FLD QL: NEGATIVE — SIGNIFICANT CHANGE UP
WBC # BLD: 3.89 K/UL — SIGNIFICANT CHANGE UP (ref 3.8–10.5)
WBC # FLD AUTO: 3.89 K/UL — SIGNIFICANT CHANGE UP (ref 3.8–10.5)
WBC UR QL: SIGNIFICANT CHANGE UP /HPF (ref 0–5)

## 2022-11-08 PROCEDURE — 74176 CT ABD & PELVIS W/O CONTRAST: CPT | Mod: 26,ME

## 2022-11-08 PROCEDURE — G1004: CPT

## 2022-11-08 PROCEDURE — 85025 COMPLETE CBC W/AUTO DIFF WBC: CPT

## 2022-11-08 PROCEDURE — 86850 RBC ANTIBODY SCREEN: CPT

## 2022-11-08 PROCEDURE — 80053 COMPREHEN METABOLIC PANEL: CPT

## 2022-11-08 PROCEDURE — 87635 SARS-COV-2 COVID-19 AMP PRB: CPT

## 2022-11-08 PROCEDURE — 86900 BLOOD TYPING SEROLOGIC ABO: CPT

## 2022-11-08 PROCEDURE — 86901 BLOOD TYPING SEROLOGIC RH(D): CPT

## 2022-11-08 PROCEDURE — 74176 CT ABD & PELVIS W/O CONTRAST: CPT | Mod: ME

## 2022-11-08 PROCEDURE — 96375 TX/PRO/DX INJ NEW DRUG ADDON: CPT

## 2022-11-08 PROCEDURE — 99285 EMERGENCY DEPT VISIT HI MDM: CPT

## 2022-11-08 PROCEDURE — 85520 HEPARIN ASSAY: CPT

## 2022-11-08 PROCEDURE — 96374 THER/PROPH/DIAG INJ IV PUSH: CPT

## 2022-11-08 PROCEDURE — 81025 URINE PREGNANCY TEST: CPT

## 2022-11-08 PROCEDURE — 99284 EMERGENCY DEPT VISIT MOD MDM: CPT | Mod: 25

## 2022-11-08 PROCEDURE — 81001 URINALYSIS AUTO W/SCOPE: CPT

## 2022-11-08 PROCEDURE — 36415 COLL VENOUS BLD VENIPUNCTURE: CPT

## 2022-11-08 PROCEDURE — 85610 PROTHROMBIN TIME: CPT

## 2022-11-08 PROCEDURE — 85730 THROMBOPLASTIN TIME PARTIAL: CPT

## 2022-11-08 RX ORDER — IBUPROFEN 200 MG
1 TABLET ORAL
Qty: 15 | Refills: 0
Start: 2022-11-08 | End: 2022-11-12

## 2022-11-08 RX ORDER — KETOROLAC TROMETHAMINE 30 MG/ML
30 SYRINGE (ML) INJECTION ONCE
Refills: 0 | Status: DISCONTINUED | OUTPATIENT
Start: 2022-11-08 | End: 2022-11-08

## 2022-11-08 RX ORDER — SODIUM CHLORIDE 9 MG/ML
1000 INJECTION INTRAMUSCULAR; INTRAVENOUS; SUBCUTANEOUS ONCE
Refills: 0 | Status: COMPLETED | OUTPATIENT
Start: 2022-11-08 | End: 2022-11-08

## 2022-11-08 RX ORDER — MORPHINE SULFATE 50 MG/1
4 CAPSULE, EXTENDED RELEASE ORAL ONCE
Refills: 0 | Status: DISCONTINUED | OUTPATIENT
Start: 2022-11-08 | End: 2022-11-08

## 2022-11-08 RX ADMIN — Medication 30 MILLIGRAM(S): at 18:06

## 2022-11-08 RX ADMIN — SODIUM CHLORIDE 1000 MILLILITER(S): 9 INJECTION INTRAMUSCULAR; INTRAVENOUS; SUBCUTANEOUS at 13:20

## 2022-11-08 RX ADMIN — MORPHINE SULFATE 4 MILLIGRAM(S): 50 CAPSULE, EXTENDED RELEASE ORAL at 13:20

## 2022-11-08 NOTE — ED PROVIDER NOTE - PATIENT PORTAL LINK FT
You can access the FollowMyHealth Patient Portal offered by [facility] by registering at the following website: [email or web address]. By joining [employer]’s FollowMyHealth portal, you will also be able to view your health information using other applications (apps) compatible with our system.

## 2023-10-20 NOTE — ED ADULT NURSE NOTE - ED STAT RN HANDOFF DETAILS
No received  pt.in bed at 1910 pt.is  awake and responsive.on tele box D with NSR. denies  chest pain. transfer to  503 report  given to kate hamilton.not in  distress

## 2024-05-12 ENCOUNTER — NON-APPOINTMENT (OUTPATIENT)
Age: 54
End: 2024-05-12

## 2024-05-15 ENCOUNTER — EMERGENCY (EMERGENCY)
Facility: HOSPITAL | Age: 54
LOS: 1 days | Discharge: HOME | End: 2024-05-15
Attending: EMERGENCY MEDICINE
Payer: COMMERCIAL

## 2024-05-15 VITALS
HEIGHT: 61 IN | SYSTOLIC BLOOD PRESSURE: 164 MMHG | TEMPERATURE: 97 F | OXYGEN SATURATION: 100 % | RESPIRATION RATE: 17 BRPM | HEART RATE: 122 BPM | WEIGHT: 164.91 LBS | DIASTOLIC BLOOD PRESSURE: 99 MMHG

## 2024-05-15 VITALS
SYSTOLIC BLOOD PRESSURE: 133 MMHG | OXYGEN SATURATION: 99 % | TEMPERATURE: 98 F | RESPIRATION RATE: 17 BRPM | HEART RATE: 96 BPM | DIASTOLIC BLOOD PRESSURE: 78 MMHG

## 2024-05-15 LAB
ALBUMIN SERPL ELPH-MCNC: 3.7 G/DL — SIGNIFICANT CHANGE UP (ref 3.5–5)
ALP SERPL-CCNC: 67 U/L — SIGNIFICANT CHANGE UP (ref 40–120)
ALT FLD-CCNC: 18 U/L DA — SIGNIFICANT CHANGE UP (ref 10–60)
ANION GAP SERPL CALC-SCNC: 4 MMOL/L — LOW (ref 5–17)
APPEARANCE UR: CLEAR — SIGNIFICANT CHANGE UP
AST SERPL-CCNC: 14 U/L — SIGNIFICANT CHANGE UP (ref 10–40)
BASOPHILS # BLD AUTO: 0.02 K/UL — SIGNIFICANT CHANGE UP (ref 0–0.2)
BASOPHILS NFR BLD AUTO: 0.4 % — SIGNIFICANT CHANGE UP (ref 0–2)
BILIRUB SERPL-MCNC: 0.3 MG/DL — SIGNIFICANT CHANGE UP (ref 0.2–1.2)
BILIRUB UR-MCNC: NEGATIVE — SIGNIFICANT CHANGE UP
BUN SERPL-MCNC: 9 MG/DL — SIGNIFICANT CHANGE UP (ref 7–18)
CALCIUM SERPL-MCNC: 9.8 MG/DL — SIGNIFICANT CHANGE UP (ref 8.4–10.5)
CHLORIDE SERPL-SCNC: 108 MMOL/L — SIGNIFICANT CHANGE UP (ref 96–108)
CO2 SERPL-SCNC: 26 MMOL/L — SIGNIFICANT CHANGE UP (ref 22–31)
COLOR SPEC: YELLOW — SIGNIFICANT CHANGE UP
CREAT SERPL-MCNC: 0.72 MG/DL — SIGNIFICANT CHANGE UP (ref 0.5–1.3)
DIFF PNL FLD: NEGATIVE — SIGNIFICANT CHANGE UP
EGFR: 101 ML/MIN/1.73M2 — SIGNIFICANT CHANGE UP
EOSINOPHIL # BLD AUTO: 0.14 K/UL — SIGNIFICANT CHANGE UP (ref 0–0.5)
EOSINOPHIL NFR BLD AUTO: 2.7 % — SIGNIFICANT CHANGE UP (ref 0–6)
GLUCOSE SERPL-MCNC: 83 MG/DL — SIGNIFICANT CHANGE UP (ref 70–99)
GLUCOSE UR QL: NEGATIVE MG/DL — SIGNIFICANT CHANGE UP
HCG SERPL-ACNC: 2 MIU/ML — SIGNIFICANT CHANGE UP
HCT VFR BLD CALC: 37.8 % — SIGNIFICANT CHANGE UP (ref 34.5–45)
HGB BLD-MCNC: 12.6 G/DL — SIGNIFICANT CHANGE UP (ref 11.5–15.5)
IMM GRANULOCYTES NFR BLD AUTO: 0.2 % — SIGNIFICANT CHANGE UP (ref 0–0.9)
KETONES UR-MCNC: NEGATIVE MG/DL — SIGNIFICANT CHANGE UP
LEUKOCYTE ESTERASE UR-ACNC: NEGATIVE — SIGNIFICANT CHANGE UP
LIDOCAIN IGE QN: 28 U/L — SIGNIFICANT CHANGE UP (ref 13–75)
LYMPHOCYTES # BLD AUTO: 1.92 K/UL — SIGNIFICANT CHANGE UP (ref 1–3.3)
LYMPHOCYTES # BLD AUTO: 37.6 % — SIGNIFICANT CHANGE UP (ref 13–44)
MCHC RBC-ENTMCNC: 27.6 PG — SIGNIFICANT CHANGE UP (ref 27–34)
MCHC RBC-ENTMCNC: 33.3 GM/DL — SIGNIFICANT CHANGE UP (ref 32–36)
MCV RBC AUTO: 82.7 FL — SIGNIFICANT CHANGE UP (ref 80–100)
MONOCYTES # BLD AUTO: 0.35 K/UL — SIGNIFICANT CHANGE UP (ref 0–0.9)
MONOCYTES NFR BLD AUTO: 6.9 % — SIGNIFICANT CHANGE UP (ref 2–14)
NEUTROPHILS # BLD AUTO: 2.66 K/UL — SIGNIFICANT CHANGE UP (ref 1.8–7.4)
NEUTROPHILS NFR BLD AUTO: 52.2 % — SIGNIFICANT CHANGE UP (ref 43–77)
NITRITE UR-MCNC: NEGATIVE — SIGNIFICANT CHANGE UP
NRBC # BLD: 0 /100 WBCS — SIGNIFICANT CHANGE UP (ref 0–0)
PH UR: 5.5 — SIGNIFICANT CHANGE UP (ref 5–8)
PLATELET # BLD AUTO: 188 K/UL — SIGNIFICANT CHANGE UP (ref 150–400)
POTASSIUM SERPL-MCNC: 3.9 MMOL/L — SIGNIFICANT CHANGE UP (ref 3.5–5.3)
POTASSIUM SERPL-SCNC: 3.9 MMOL/L — SIGNIFICANT CHANGE UP (ref 3.5–5.3)
PROT SERPL-MCNC: 7.5 G/DL — SIGNIFICANT CHANGE UP (ref 6–8.3)
PROT UR-MCNC: NEGATIVE MG/DL — SIGNIFICANT CHANGE UP
RBC # BLD: 4.57 M/UL — SIGNIFICANT CHANGE UP (ref 3.8–5.2)
RBC # FLD: 12.3 % — SIGNIFICANT CHANGE UP (ref 10.3–14.5)
SODIUM SERPL-SCNC: 138 MMOL/L — SIGNIFICANT CHANGE UP (ref 135–145)
SP GR SPEC: 1.01 — SIGNIFICANT CHANGE UP (ref 1–1.03)
UROBILINOGEN FLD QL: 0.2 MG/DL — SIGNIFICANT CHANGE UP (ref 0.2–1)
WBC # BLD: 5.1 K/UL — SIGNIFICANT CHANGE UP (ref 3.8–10.5)
WBC # FLD AUTO: 5.1 K/UL — SIGNIFICANT CHANGE UP (ref 3.8–10.5)

## 2024-05-15 PROCEDURE — 70450 CT HEAD/BRAIN W/O DYE: CPT | Mod: 26,MC

## 2024-05-15 PROCEDURE — 96375 TX/PRO/DX INJ NEW DRUG ADDON: CPT

## 2024-05-15 PROCEDURE — 81003 URINALYSIS AUTO W/O SCOPE: CPT

## 2024-05-15 PROCEDURE — 83690 ASSAY OF LIPASE: CPT

## 2024-05-15 PROCEDURE — 70450 CT HEAD/BRAIN W/O DYE: CPT | Mod: MC

## 2024-05-15 PROCEDURE — 82962 GLUCOSE BLOOD TEST: CPT

## 2024-05-15 PROCEDURE — 99285 EMERGENCY DEPT VISIT HI MDM: CPT | Mod: 25

## 2024-05-15 PROCEDURE — 96374 THER/PROPH/DIAG INJ IV PUSH: CPT

## 2024-05-15 PROCEDURE — 93005 ELECTROCARDIOGRAM TRACING: CPT

## 2024-05-15 PROCEDURE — 84702 CHORIONIC GONADOTROPIN TEST: CPT

## 2024-05-15 PROCEDURE — 80053 COMPREHEN METABOLIC PANEL: CPT

## 2024-05-15 PROCEDURE — 99284 EMERGENCY DEPT VISIT MOD MDM: CPT

## 2024-05-15 PROCEDURE — 85025 COMPLETE CBC W/AUTO DIFF WBC: CPT

## 2024-05-15 PROCEDURE — 36415 COLL VENOUS BLD VENIPUNCTURE: CPT

## 2024-05-15 RX ORDER — SODIUM CHLORIDE 9 MG/ML
1000 INJECTION INTRAMUSCULAR; INTRAVENOUS; SUBCUTANEOUS ONCE
Refills: 0 | Status: COMPLETED | OUTPATIENT
Start: 2024-05-15 | End: 2024-05-15

## 2024-05-15 RX ORDER — METOCLOPRAMIDE HCL 10 MG
10 TABLET ORAL ONCE
Refills: 0 | Status: COMPLETED | OUTPATIENT
Start: 2024-05-15 | End: 2024-05-15

## 2024-05-15 RX ORDER — KETOROLAC TROMETHAMINE 30 MG/ML
15 SYRINGE (ML) INJECTION ONCE
Refills: 0 | Status: DISCONTINUED | OUTPATIENT
Start: 2024-05-15 | End: 2024-05-15

## 2024-05-15 RX ORDER — ONDANSETRON 8 MG/1
1 TABLET, FILM COATED ORAL
Qty: 1 | Refills: 0
Start: 2024-05-15

## 2024-05-15 RX ADMIN — Medication 15 MILLIGRAM(S): at 18:28

## 2024-05-15 RX ADMIN — Medication 104 MILLIGRAM(S): at 19:17

## 2024-05-15 RX ADMIN — SODIUM CHLORIDE 1000 MILLILITER(S): 9 INJECTION INTRAMUSCULAR; INTRAVENOUS; SUBCUTANEOUS at 18:27
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
## 2024-05-15 NOTE — ED PROVIDER NOTE - NSFOLLOWUPINSTRUCTIONS_ED_ALL_ED_FT
Acute Headache    WHAT YOU NEED TO KNOW:    What is an acute headache? An acute headache is pain or discomfort that may start suddenly and get worse quickly. You may have an acute headache only when you feel stress or eat certain foods. Other acute headache pain can happen every day, and sometimes several times a day.    What are the most common types of acute headache?    Tension headache is the most common type of headache. These headaches typically occur in the late afternoon and go away by evening. The pain is usually mild or moderate. You may have problems tolerating bright light or loud noise. The pain is usually across the forehead or in the back of the head, often only on one side. These headaches may occur every day.    Migraine headaches cause moderate or severe pain. The headache generally lasts from 1 to 3 days and tends to come back. Pain is usually on only one side, but it may change sides. Migraines often occur in the temple, the back of the head, or behind the eye. The pain may throb or be sharp and steady.    A migraine with aura means you see or feel something before a migraine. You may see a small spot surrounded by bright zigzag lines. Other signs or symptoms may follow the aura.    Cluster headache pain is usually only on one side. It often causes severe pain, and can last for 30 minutes to 2 hours. These headaches may occur 1 or 2 times each day, more often at night. The pain may wake you.  Headache Types  What causes acute headaches? The cause of your headache may not be known. The following can trigger a headache:    Stress or tension, hours or even days after stressful events    Fatigue, a lack of sleep or changes in your usual sleep pattern, or a nap during the day    Menstruation, especially after pregnancy, or use of birth control pills or hormone replacement therapy    Food such as cured meats, artificial sweeteners, alcohol, dark chocolate, and MSG    Suddenly not having caffeine if you usually have larger amounts    A medical problem, such as an infection, tooth pain, neck or sinus pain, thyroid problems, or a tumor    A head injury  How is the type of acute headache diagnosed and treated? Your healthcare provider will ask you to describe your pain and rate it on a scale from 1 to 10. Tell the provider how often you have headaches and how long they last. Also describe any other symptoms you have along with headaches, such as dizziness or blurred vision. You may need tests including a CT scan to make sure there is not a leak in any blood vessels.    Medicines may be given to manage or prevent headaches. The medicine will depend on the type of acute headache you have. Do not wait until the pain is severe before you take your medicine. You may be able to take over-the-counter pain medicines as needed. Examples include NSAIDs and acetaminophen. Ask your healthcare provider which medicine is right for you. Ask how much to take and when to take it. Follow directions. These medicines can cause stomach bleeding or kidney or liver damage if not taken correctly.    Biofeedback may be used to help you manage stress. Electrodes (wires) are placed on your body and attached to a monitor. You will learn how to change stress reactions. For example, you learn to slow your heart rate when you become upset.    Cognitive behavior therapy, or stress management, may be used with other therapies to prevent headaches.  What can I do to manage my symptoms?    Apply heat or ice on the headache area. Use a heat or ice pack. For an ice pack, you can also put crushed ice in a plastic bag. Cover the pack or bag with a towel before you apply it to your skin. Ice and heat both help decrease pain, and heat also helps decrease muscle spasms. Apply heat for 20 to 30 minutes every 2 hours. Apply ice for 15 to 20 minutes every hour. Apply heat or ice for as long and for as many days as directed. You may alternate heat and ice.    Relax your muscles. Lie down in a comfortable position and close your eyes. Relax your muscles slowly. Start at your toes and work your way up your body.    Keep a record of your headaches. Write down when your headaches start and stop. Include your symptoms and what you were doing when the headache began. Record what you ate or drank for 24 hours before the headache started. Describe the pain and where it hurts. Keep track of what you did to treat your headache and if it worked.  What can I do to prevent an acute headache?    Avoid anything that triggers an acute headache. Examples include exposure to chemicals, going to high altitude, or not getting enough sleep. Create a regular sleep routine. Go to sleep at the same time and wake up at the same time each day. Do not use electronic devices before bedtime. These may trigger a headache or prevent you from sleeping well.    Do not smoke. Nicotine and other chemicals in cigarettes and cigars can trigger an acute headache or make it worse. Ask your healthcare provider for information if you currently smoke and need help to quit. E-cigarettes or smokeless tobacco still contain nicotine. Talk to your healthcare provider before you use these products.    Limit alcohol as directed. Alcohol can trigger an acute headache or make it worse. If you have cluster headaches, do not drink alcohol during an episode. For other types of headaches, ask your healthcare provider if it is safe for you to drink alcohol. Ask how much is safe for you to drink, and how often.    Exercise as directed. Exercise can reduce tension and help with headache pain. Aim for 30 minutes of physical activity on most days of the week. Your healthcare provider can help you create an exercise plan.    Eat a variety of healthy foods. Healthy foods include fruits, vegetables, low-fat dairy products, lean meats, fish, whole grains, and cooked beans. Your healthcare provider or dietitian can help you create meals plans if you need to avoid foods that trigger headaches.  When should I seek immediate care?    You have severe pain.    You have numbness or weakness on one side of your face or body.    You have a headache that occurs after a blow to the head, a fall, or other trauma.    You have a headache, are forgetful or confused, or have trouble speaking.    You have a headache, stiff neck, and a fever.  When should I call my doctor?    You have a constant headache and are vomiting.    You have a headache each day that does not get better, even after treatment.    You have changes in your headaches, or new symptoms that occur when you have a headache.    You have questions or concerns about your condition or care.  CARE AGREEMENT:    You have the right to help plan your care. Learn about your health condition and how it may be treated. Discuss treatment options with your healthcare providers to decide what care you want to receive. You always have the right to refuse treatment.    © [employer] 1973, 2024

## 2024-05-15 NOTE — ED PROVIDER NOTE - PATIENT PORTAL LINK FT
You can access the FollowMyHealth Patient Portal offered by [facility] by registering at the following website: [email or web address]. By joining [employer]’s FollowMyHealth portal, you will also be able to view your health information using other applications (apps) compatible with our system.
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
## 2024-05-15 NOTE — ED PROVIDER NOTE - CARE PROVIDER_API CALL
Nate Dorado)  Neurology  [address] [place] [address]  Phone: [phone]  Fax: [phone]  Follow Up Time:

## 2024-11-06 NOTE — DISCHARGE NOTE NURSING/CASE MANAGEMENT/SOCIAL WORK - NSDCPEFALRISK_GEN_ALL_CORE
Patient information on fall and injury prevention Patient/Caregiver provided printed discharge information.

## 2024-11-19 ENCOUNTER — NON-APPOINTMENT (OUTPATIENT)
Age: 54
End: 2024-11-19